# Patient Record
Sex: FEMALE | Race: WHITE | NOT HISPANIC OR LATINO | Employment: UNEMPLOYED | URBAN - METROPOLITAN AREA
[De-identification: names, ages, dates, MRNs, and addresses within clinical notes are randomized per-mention and may not be internally consistent; named-entity substitution may affect disease eponyms.]

---

## 2024-01-05 ENCOUNTER — APPOINTMENT (OUTPATIENT)
Dept: CARDIOLOGY | Facility: HOSPITAL | Age: 59
End: 2024-01-05
Payer: COMMERCIAL

## 2024-01-05 ENCOUNTER — APPOINTMENT (OUTPATIENT)
Dept: RADIOLOGY | Facility: HOSPITAL | Age: 59
End: 2024-01-05
Payer: COMMERCIAL

## 2024-01-05 ENCOUNTER — HOSPITAL ENCOUNTER (EMERGENCY)
Facility: HOSPITAL | Age: 59
Discharge: HOME | End: 2024-01-05
Payer: COMMERCIAL

## 2024-01-05 VITALS
TEMPERATURE: 98.6 F | DIASTOLIC BLOOD PRESSURE: 86 MMHG | HEIGHT: 66 IN | WEIGHT: 242.51 LBS | OXYGEN SATURATION: 98 % | RESPIRATION RATE: 20 BRPM | SYSTOLIC BLOOD PRESSURE: 156 MMHG | HEART RATE: 80 BPM | BODY MASS INDEX: 38.97 KG/M2

## 2024-01-05 DIAGNOSIS — R06.00 DYSPNEA, UNSPECIFIED TYPE: Primary | ICD-10-CM

## 2024-01-05 DIAGNOSIS — R07.89 CHEST DISCOMFORT: ICD-10-CM

## 2024-01-05 LAB
ALBUMIN SERPL-MCNC: 4.2 G/DL (ref 3.5–5)
ALP BLD-CCNC: 92 U/L (ref 35–125)
ALT SERPL-CCNC: 25 U/L (ref 5–40)
ANION GAP SERPL CALC-SCNC: 8 MMOL/L
AST SERPL-CCNC: 17 U/L (ref 5–40)
BASOPHILS # BLD AUTO: 0.11 X10*3/UL (ref 0–0.1)
BASOPHILS NFR BLD AUTO: 1.1 %
BILIRUB SERPL-MCNC: 0.3 MG/DL (ref 0.1–1.2)
BUN SERPL-MCNC: 20 MG/DL (ref 8–25)
CALCIUM SERPL-MCNC: 9.3 MG/DL (ref 8.5–10.4)
CHLORIDE SERPL-SCNC: 103 MMOL/L (ref 97–107)
CO2 SERPL-SCNC: 30 MMOL/L (ref 24–31)
CREAT SERPL-MCNC: 0.9 MG/DL (ref 0.4–1.6)
EOSINOPHIL # BLD AUTO: 0.22 X10*3/UL (ref 0–0.7)
EOSINOPHIL NFR BLD AUTO: 2.1 %
ERYTHROCYTE [DISTWIDTH] IN BLOOD BY AUTOMATED COUNT: 13.1 % (ref 11.5–14.5)
FLUAV RNA RESP QL NAA+PROBE: NOT DETECTED
FLUBV RNA RESP QL NAA+PROBE: NOT DETECTED
GFR SERPL CREATININE-BSD FRML MDRD: 75 ML/MIN/1.73M*2
GLUCOSE SERPL-MCNC: 107 MG/DL (ref 65–99)
HCT VFR BLD AUTO: 38.1 % (ref 36–46)
HGB BLD-MCNC: 12.6 G/DL (ref 12–16)
IMM GRANULOCYTES # BLD AUTO: 0.03 X10*3/UL (ref 0–0.7)
IMM GRANULOCYTES NFR BLD AUTO: 0.3 % (ref 0–0.9)
LYMPHOCYTES # BLD AUTO: 1.92 X10*3/UL (ref 1.2–4.8)
LYMPHOCYTES NFR BLD AUTO: 18.8 %
MCH RBC QN AUTO: 31.5 PG (ref 26–34)
MCHC RBC AUTO-ENTMCNC: 33.1 G/DL (ref 32–36)
MCV RBC AUTO: 95 FL (ref 80–100)
MONOCYTES # BLD AUTO: 0.77 X10*3/UL (ref 0.1–1)
MONOCYTES NFR BLD AUTO: 7.5 %
NEUTROPHILS # BLD AUTO: 7.19 X10*3/UL (ref 1.2–7.7)
NEUTROPHILS NFR BLD AUTO: 70.2 %
NRBC BLD-RTO: 0 /100 WBCS (ref 0–0)
NT-PROBNP SERPL-MCNC: 51 PG/ML (ref 0–226)
PLATELET # BLD AUTO: 310 X10*3/UL (ref 150–450)
POTASSIUM SERPL-SCNC: 3.5 MMOL/L (ref 3.4–5.1)
PROT SERPL-MCNC: 7 G/DL (ref 5.9–7.9)
RBC # BLD AUTO: 4 X10*6/UL (ref 4–5.2)
RSV RNA RESP QL NAA+PROBE: NOT DETECTED
SARS-COV-2 RNA RESP QL NAA+PROBE: NOT DETECTED
SODIUM SERPL-SCNC: 141 MMOL/L (ref 133–145)
TROPONIN T SERPL-MCNC: 7 NG/L
TROPONIN T SERPL-MCNC: <6 NG/L
WBC # BLD AUTO: 10.2 X10*3/UL (ref 4.4–11.3)

## 2024-01-05 PROCEDURE — 2550000001 HC RX 255 CONTRASTS: Performed by: PHYSICIAN ASSISTANT

## 2024-01-05 PROCEDURE — 80053 COMPREHEN METABOLIC PANEL: CPT | Performed by: PHYSICIAN ASSISTANT

## 2024-01-05 PROCEDURE — 85025 COMPLETE CBC W/AUTO DIFF WBC: CPT | Performed by: PHYSICIAN ASSISTANT

## 2024-01-05 PROCEDURE — 84484 ASSAY OF TROPONIN QUANT: CPT | Performed by: PHYSICIAN ASSISTANT

## 2024-01-05 PROCEDURE — 2500000002 HC RX 250 W HCPCS SELF ADMINISTERED DRUGS (ALT 637 FOR MEDICARE OP, ALT 636 FOR OP/ED): Performed by: PHYSICIAN ASSISTANT

## 2024-01-05 PROCEDURE — 36415 COLL VENOUS BLD VENIPUNCTURE: CPT | Performed by: PHYSICIAN ASSISTANT

## 2024-01-05 PROCEDURE — 71275 CT ANGIOGRAPHY CHEST: CPT

## 2024-01-05 PROCEDURE — 99284 EMERGENCY DEPT VISIT MOD MDM: CPT

## 2024-01-05 PROCEDURE — 83880 ASSAY OF NATRIURETIC PEPTIDE: CPT | Performed by: PHYSICIAN ASSISTANT

## 2024-01-05 PROCEDURE — 99285 EMERGENCY DEPT VISIT HI MDM: CPT

## 2024-01-05 PROCEDURE — 87637 SARSCOV2&INF A&B&RSV AMP PRB: CPT | Performed by: PHYSICIAN ASSISTANT

## 2024-01-05 PROCEDURE — 93005 ELECTROCARDIOGRAM TRACING: CPT

## 2024-01-05 PROCEDURE — 94640 AIRWAY INHALATION TREATMENT: CPT | Mod: 25

## 2024-01-05 RX ORDER — ALBUTEROL SULFATE 90 UG/1
2 AEROSOL, METERED RESPIRATORY (INHALATION) EVERY 4 HOURS PRN
Qty: 18 G | Refills: 0 | Status: SHIPPED | OUTPATIENT
Start: 2024-01-05 | End: 2024-02-04

## 2024-01-05 RX ORDER — ACETAMINOPHEN 325 MG/1
975 TABLET ORAL ONCE
Status: COMPLETED | OUTPATIENT
Start: 2024-01-05 | End: 2024-01-05

## 2024-01-05 RX ORDER — CETIRIZINE HYDROCHLORIDE 10 MG/1
10 TABLET ORAL DAILY
Qty: 30 TABLET | Refills: 0 | Status: SHIPPED | OUTPATIENT
Start: 2024-01-05 | End: 2024-02-04

## 2024-01-05 RX ORDER — IPRATROPIUM BROMIDE AND ALBUTEROL SULFATE 2.5; .5 MG/3ML; MG/3ML
3 SOLUTION RESPIRATORY (INHALATION)
Status: DISCONTINUED | OUTPATIENT
Start: 2024-01-05 | End: 2024-01-05 | Stop reason: HOSPADM

## 2024-01-05 RX ADMIN — IOHEXOL 75 ML: 350 INJECTION, SOLUTION INTRAVENOUS at 17:39

## 2024-01-05 RX ADMIN — ACETAMINOPHEN 975 MG: 325 TABLET ORAL at 18:28

## 2024-01-05 RX ADMIN — IPRATROPIUM BROMIDE AND ALBUTEROL SULFATE 3 ML: 2.5; .5 SOLUTION RESPIRATORY (INHALATION) at 18:28

## 2024-01-05 ASSESSMENT — PAIN - FUNCTIONAL ASSESSMENT: PAIN_FUNCTIONAL_ASSESSMENT: 0-10

## 2024-01-05 ASSESSMENT — COLUMBIA-SUICIDE SEVERITY RATING SCALE - C-SSRS
6. HAVE YOU EVER DONE ANYTHING, STARTED TO DO ANYTHING, OR PREPARED TO DO ANYTHING TO END YOUR LIFE?: NO
2. HAVE YOU ACTUALLY HAD ANY THOUGHTS OF KILLING YOURSELF?: NO
1. IN THE PAST MONTH, HAVE YOU WISHED YOU WERE DEAD OR WISHED YOU COULD GO TO SLEEP AND NOT WAKE UP?: NO

## 2024-01-05 ASSESSMENT — PAIN DESCRIPTION - PROGRESSION: CLINICAL_PROGRESSION: NOT CHANGED

## 2024-01-05 ASSESSMENT — PAIN SCALES - GENERAL: PAINLEVEL_OUTOF10: 0 - NO PAIN

## 2024-01-05 NOTE — ED PROVIDER NOTES
HPI   Chief Complaint   Patient presents with    Leg Swelling     Leg swelling and shortness of breath       58-year-old female presented emergency department with a chief complaint of shortness of breath, cough, lower extremity edema.  Symptoms been for the last week.  She does have a history of chronic lower extremity edema.  She is on torsemide.  She states that she does not have a history of congestive heart failure she was just on the torsemide for intermittent lower extremity edema.  She also complains of a wheeze.  She has a history of asthmatic bronchitis.  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  She is denying chest pain or pressure.  Nontoxic-appearing.  Normal vital signs.                          [place] Coma Scale Score: 15                  Patient History   Past Medical History:   Diagnosis Date    Personal history of diseases of the blood and blood-forming organs and certain disorders involving the immune mechanism     History of autoimmune disorder    Personal history of other diseases of the nervous system and sense organs     History of obstructive sleep apnea     Past Surgical History:   Procedure Laterality Date    OTHER SURGICAL HISTORY  11/02/2022    Sinus surgery    OTHER SURGICAL HISTORY  11/02/2022    Hysterectomy     No family history on file.  Social History     Tobacco Use    Smoking status: Unknown    Smokeless tobacco: Not on file   Substance Use Topics    Alcohol use: Not on file    Drug use: Not on file       Physical Exam   ED Triage Vitals [01/05/24 1543]   Temp Heart Rate Resp BP   37 °C (98.6 °F) 79 20 (!) 153/92      SpO2 Temp Source Heart Rate Source Patient Position   98 % Oral Monitor Sitting      BP Location FiO2 (%)     Right arm --       Physical Exam  Vitals and nursing note reviewed.   Constitutional:       Appearance: Normal appearance.   HENT:       Head: Normocephalic.      Nose: Nose normal.      Mouth/Throat:      Mouth: Mucous membranes are moist.   Cardiovascular:      Rate and Rhythm: Normal rate and regular rhythm.   Pulmonary:      Effort: Pulmonary effort is normal.      Breath sounds: Normal breath sounds.   Abdominal:      General: Abdomen is flat.      Palpations: Abdomen is soft.   Musculoskeletal:         General: Normal range of motion.      Cervical back: Normal range of motion and neck supple.   Skin:     General: Skin is warm and dry.   Neurological:      General: No focal deficit present.      Mental Status: She is alert and oriented to person, place, and time.   Psychiatric:         Mood and Affect: Mood normal.         ED Course & MDM   ED Course as of 01/05/24 1901 Fri Jan 05, 2024 1842 EKG reviewed by physician with independent interpretation by myself reveals a normal sinus rhythm with a ventricular rate of 76 bpm, PA interval 168, QRS 98, QTc 432, normal axis, no ST segment elevation, no EKG immediately comparable [JH]      ED Course User Index  [JH] Brendan Chow PA-C         Diagnoses as of 01/05/24 1901   Dyspnea, unspecified type   Chest discomfort       Medical Decision Making  I have seen and evaluated this patient.  Physician available for consultation.  Vital signs have been reviewed.  All laboratory and diagnostic imaging is reviewed by myself and interpreted by myself unless otherwise stated.  Additionally imaging is interpreted by radiologist.    CBC without significant leukocytosis or anemia, metabolic panel without significant renal impairment or electrolyte abnormality.  Troponin within an appropriate range without significant delta change, chest x-ray without actionable cardiopulmonary process, EKG without evidence of acute ischemia.  CT angiogram negative for pulmonary embolism or pulmonary abnormality.  Patient has improvement of DuoNeb aerosol treatment.  There is no evidence of acute coronary syndrome,  pulmonary embolism, pneumonia.  Suspect this is likely an asthmatic bronchitis from patient's recent exposure to the animal dander.  Has improvement after DuoNeb treatment.  Will continue albuterol and cetirizine.  Released in good condition.    Labs Reviewed  CBC WITH AUTO DIFFERENTIAL - Abnormal     WBC                           10.2                   nRBC                          0.0                    RBC                           4.00                   Hemoglobin                    12.6                   Hematocrit                    38.1                   MCV                           95                     MCH                           31.5                   MCHC                          33.1                   RDW                           13.1                   Platelets                     310                    Neutrophils %                 70.2                   Immature Granulocytes %, Automated   0.3                    Lymphocytes %                 18.8                   Monocytes %                   7.5                    Eosinophils %                 2.1                    Basophils %                   1.1                    Neutrophils Absolute          7.19                   Immature Granulocytes Absolute, Au*   0.03                   Lymphocytes Absolute          1.92                   Monocytes Absolute            0.77                   Eosinophils Absolute          0.22                   Basophils Absolute            0.11 (*)            COMPREHENSIVE METABOLIC PANEL - Abnormal     Glucose                       107 (*)                Sodium                        141                    Potassium                     3.5                    Chloride                      103                    Bicarbonate                   30                     Urea Nitrogen                 20                     Creatinine                    0.90                   eGFR                          75                      Calcium                       9.3                    Albumin                       4.2                    Alkaline Phosphatase          92                     Total Protein                 7.0                    AST                           17                     Bilirubin, Total              0.3                    ALT                           25                     Anion Gap                     8                   N-TERMINAL PROBNP - Normal     PROBNP                        51                         Narrative: Reference ranges are based on clinical submission data. These ranges represent the 95th percentile of normal cut-off points. As NT Pro- BNP values approach 1000 pg/ml, clinical symptoms are more likely associated with CHF.  RSV PCR - Normal     RSV PCR                                                  Narrative: This assay is an FDA-cleared, in vitro diagnostic nucleic acid amplification test for the detection of RSV from nasopharyngeal specimens, and has been validated for use at [facility]. Negative results do not preclude RSV infections, and should not be used as the sole basis for diagnosis, treatment, or other management decisions. If Influenza A/B and RSV PCR results are negative, testing for Parainfluenza virus, Adenovirus and Metapneumovirus is routinely performed for pediatric oncology and intensive care inpatients at [facility] and is available on other patients by placing an add-on request.                                      SARS-COV-2 AND INFLUENZA A/B PCR - Normal     Flu A Result                                         Flu B Result                                         Coronavirus 2019, PCR                                    Narrative: This assay has received FDA Emergency Use Authorization (EUA) and  is only authorized for the duration of time that circumstances exist to justify the authorization of the emergency use of in vitro diagnostic tests for the detection of  SARS-CoV-2 virus and/or diagnosis of COVID-19 infection under section 564(b)(1) of the Act, 21 U.S.C. 360bbb-3(b)(1). Testing for SARS-CoV-2 is only recommended for patients who meet current clinical and/or epidemiological criteria as defined by federal, state, or local public health directives. This assay is an in vitro diagnostic nucleic acid amplification test for the qualitative detection of SARS-CoV-2, Influenza A, and Influenza B from nasopharyngeal specimens and has been validated for use at [facility]. Negative results do not preclude COVID-19 infections or Influenza A/B infections, and should not be used as the sole basis for diagnosis, treatment, or other management decisions. If Influenza A/B and RSV PCR results are negative, testing for Parainfluenza virus, Adenovirus and Metapneumovirus is routinely performed for [facility] pediatric oncology and intensive care inpatients, and is available on other patients by placing an add-on request.   SERIAL TROPONIN, INITIAL ([facility]) - Normal     Troponin T, High Sensitivity   7                   SERIAL TROPONIN,  2 HOUR ([facility]) - Normal     Troponin T, High Sensitivity   <6                  TROPONIN T SERIES, HIGH SENSITIVITY (0, 2 HR, 6 HR)    CT angio chest for pulmonary embolism   Final Result    No acute pathologic findings are identified.          MACRO:    None.                Signed by: Gal Ritchie 1/5/2024 6:05 PM    Dictation workstation:   [id number]              Your medication list        START taking these medications        Instructions Last Dose Given Next Dose Due   albuterol 90 mcg/actuation inhaler      Inhale 2 puffs every 4 hours if needed for wheezing.       cetirizine 10 mg tablet  Commonly known as: ZyrTEC      Take 1 tablet (10 mg) by mouth once daily for 30 doses.                 Where to Get Your Medications        You can get these medications from any pharmacy    Bring a paper prescription for each of these  medications  albuterol 90 mcg/actuation inhaler  cetirizine 10 mg tablet           Procedure  Procedures     Brendan Chow PA-C  01/05/24 3721

## 2024-02-07 ENCOUNTER — HOSPITAL ENCOUNTER (EMERGENCY)
Facility: HOSPITAL | Age: 59
Discharge: LEFT BEFORE DISCHARGE | End: 2024-02-07
Payer: COMMERCIAL

## 2024-02-07 ENCOUNTER — APPOINTMENT (OUTPATIENT)
Dept: RADIOLOGY | Facility: HOSPITAL | Age: 59
End: 2024-02-07
Payer: COMMERCIAL

## 2024-02-07 VITALS
BODY MASS INDEX: 40.11 KG/M2 | RESPIRATION RATE: 20 BRPM | OXYGEN SATURATION: 95 % | HEIGHT: 66 IN | DIASTOLIC BLOOD PRESSURE: 85 MMHG | HEART RATE: 95 BPM | WEIGHT: 249.56 LBS | SYSTOLIC BLOOD PRESSURE: 126 MMHG | TEMPERATURE: 99.7 F

## 2024-02-07 LAB
ALBUMIN SERPL-MCNC: 4 G/DL (ref 3.5–5)
ALP BLD-CCNC: 91 U/L (ref 35–125)
ALT SERPL-CCNC: 17 U/L (ref 5–40)
ANION GAP SERPL CALC-SCNC: 9 MMOL/L
APPEARANCE UR: CLEAR
AST SERPL-CCNC: 15 U/L (ref 5–40)
BASOPHILS # BLD AUTO: 0.03 X10*3/UL (ref 0–0.1)
BASOPHILS NFR BLD AUTO: 0.3 %
BILIRUB SERPL-MCNC: 0.6 MG/DL (ref 0.1–1.2)
BILIRUB UR STRIP.AUTO-MCNC: NEGATIVE MG/DL
BUN SERPL-MCNC: 19 MG/DL (ref 8–25)
CALCIUM SERPL-MCNC: 8.8 MG/DL (ref 8.5–10.4)
CHLORIDE SERPL-SCNC: 104 MMOL/L (ref 97–107)
CO2 SERPL-SCNC: 27 MMOL/L (ref 24–31)
COLOR UR: NORMAL
CREAT SERPL-MCNC: 0.8 MG/DL (ref 0.4–1.6)
EGFRCR SERPLBLD CKD-EPI 2021: 87 ML/MIN/1.73M*2
EOSINOPHIL # BLD AUTO: 0.04 X10*3/UL (ref 0–0.7)
EOSINOPHIL NFR BLD AUTO: 0.3 %
ERYTHROCYTE [DISTWIDTH] IN BLOOD BY AUTOMATED COUNT: 12.9 % (ref 11.5–14.5)
GLUCOSE SERPL-MCNC: 128 MG/DL (ref 65–99)
GLUCOSE UR STRIP.AUTO-MCNC: NORMAL MG/DL
HCG UR QL IA.RAPID: NEGATIVE
HCT VFR BLD AUTO: 41.3 % (ref 36–46)
HGB BLD-MCNC: 13.8 G/DL (ref 12–16)
IMM GRANULOCYTES # BLD AUTO: 0.04 X10*3/UL (ref 0–0.7)
IMM GRANULOCYTES NFR BLD AUTO: 0.3 % (ref 0–0.9)
KETONES UR STRIP.AUTO-MCNC: NEGATIVE MG/DL
LEUKOCYTE ESTERASE UR QL STRIP.AUTO: NEGATIVE
LIPASE SERPL-CCNC: 21 U/L (ref 16–63)
LYMPHOCYTES # BLD AUTO: 0.5 X10*3/UL (ref 1.2–4.8)
LYMPHOCYTES NFR BLD AUTO: 4.2 %
MCH RBC QN AUTO: 31.7 PG (ref 26–34)
MCHC RBC AUTO-ENTMCNC: 33.4 G/DL (ref 32–36)
MCV RBC AUTO: 95 FL (ref 80–100)
MONOCYTES # BLD AUTO: 0.42 X10*3/UL (ref 0.1–1)
MONOCYTES NFR BLD AUTO: 3.5 %
NEUTROPHILS # BLD AUTO: 10.84 X10*3/UL (ref 1.2–7.7)
NEUTROPHILS NFR BLD AUTO: 91.4 %
NITRITE UR QL STRIP.AUTO: NEGATIVE
NRBC BLD-RTO: 0 /100 WBCS (ref 0–0)
PH UR STRIP.AUTO: 5.5 [PH]
PLATELET # BLD AUTO: 303 X10*3/UL (ref 150–450)
POTASSIUM SERPL-SCNC: 3.7 MMOL/L (ref 3.4–5.1)
PROT SERPL-MCNC: 6.7 G/DL (ref 5.9–7.9)
PROT UR STRIP.AUTO-MCNC: NORMAL MG/DL
RBC # BLD AUTO: 4.35 X10*6/UL (ref 4–5.2)
RBC # UR STRIP.AUTO: NEGATIVE /UL
RBC #/AREA URNS AUTO: NORMAL /HPF
SODIUM SERPL-SCNC: 140 MMOL/L (ref 133–145)
SP GR UR STRIP.AUTO: 1.03
SQUAMOUS #/AREA URNS AUTO: NORMAL /HPF
UROBILINOGEN UR STRIP.AUTO-MCNC: NORMAL MG/DL
WBC # BLD AUTO: 11.9 X10*3/UL (ref 4.4–11.3)
WBC #/AREA URNS AUTO: NORMAL /HPF

## 2024-02-07 PROCEDURE — 74177 CT ABD & PELVIS W/CONTRAST: CPT

## 2024-02-07 PROCEDURE — 81025 URINE PREGNANCY TEST: CPT | Performed by: EMERGENCY MEDICINE

## 2024-02-07 PROCEDURE — 2500000004 HC RX 250 GENERAL PHARMACY W/ HCPCS (ALT 636 FOR OP/ED): Performed by: EMERGENCY MEDICINE

## 2024-02-07 PROCEDURE — 99284 EMERGENCY DEPT VISIT MOD MDM: CPT

## 2024-02-07 PROCEDURE — 83690 ASSAY OF LIPASE: CPT | Performed by: EMERGENCY MEDICINE

## 2024-02-07 PROCEDURE — 81001 URINALYSIS AUTO W/SCOPE: CPT | Performed by: EMERGENCY MEDICINE

## 2024-02-07 PROCEDURE — 96374 THER/PROPH/DIAG INJ IV PUSH: CPT

## 2024-02-07 PROCEDURE — 80053 COMPREHEN METABOLIC PANEL: CPT | Performed by: EMERGENCY MEDICINE

## 2024-02-07 PROCEDURE — 36415 COLL VENOUS BLD VENIPUNCTURE: CPT | Performed by: EMERGENCY MEDICINE

## 2024-02-07 PROCEDURE — 85025 COMPLETE CBC W/AUTO DIFF WBC: CPT | Performed by: EMERGENCY MEDICINE

## 2024-02-07 PROCEDURE — 2550000001 HC RX 255 CONTRASTS: Performed by: EMERGENCY MEDICINE

## 2024-02-07 PROCEDURE — 96361 HYDRATE IV INFUSION ADD-ON: CPT

## 2024-02-07 RX ORDER — ONDANSETRON HYDROCHLORIDE 2 MG/ML
4 INJECTION, SOLUTION INTRAVENOUS ONCE
Status: COMPLETED | OUTPATIENT
Start: 2024-02-07 | End: 2024-02-07

## 2024-02-07 RX ADMIN — ONDANSETRON 4 MG: 2 INJECTION INTRAMUSCULAR; INTRAVENOUS at 18:54

## 2024-02-07 RX ADMIN — IOHEXOL 75 ML: 350 INJECTION, SOLUTION INTRAVENOUS at 19:28

## 2024-02-07 RX ADMIN — SODIUM CHLORIDE 1000 ML: 900 INJECTION, SOLUTION INTRAVENOUS at 18:55

## 2024-02-07 ASSESSMENT — PAIN SCALES - GENERAL: PAINLEVEL_OUTOF10: 10 - WORST POSSIBLE PAIN

## 2024-02-07 ASSESSMENT — PAIN DESCRIPTION - DESCRIPTORS: DESCRIPTORS: ACHING

## 2024-02-07 ASSESSMENT — COLUMBIA-SUICIDE SEVERITY RATING SCALE - C-SSRS
6. HAVE YOU EVER DONE ANYTHING, STARTED TO DO ANYTHING, OR PREPARED TO DO ANYTHING TO END YOUR LIFE?: NO
1. IN THE PAST MONTH, HAVE YOU WISHED YOU WERE DEAD OR WISHED YOU COULD GO TO SLEEP AND NOT WAKE UP?: NO
2. HAVE YOU ACTUALLY HAD ANY THOUGHTS OF KILLING YOURSELF?: NO

## 2024-02-07 ASSESSMENT — PAIN - FUNCTIONAL ASSESSMENT: PAIN_FUNCTIONAL_ASSESSMENT: 0-10

## 2024-02-07 ASSESSMENT — PAIN DESCRIPTION - ONSET: ONSET: GRADUAL

## 2024-02-07 ASSESSMENT — PAIN DESCRIPTION - PROGRESSION: CLINICAL_PROGRESSION: GRADUALLY WORSENING

## 2024-02-07 ASSESSMENT — PAIN DESCRIPTION - PAIN TYPE: TYPE: ACUTE PAIN

## 2024-02-07 ASSESSMENT — PAIN DESCRIPTION - LOCATION: LOCATION: ABDOMEN

## 2024-02-07 ASSESSMENT — PAIN DESCRIPTION - FREQUENCY: FREQUENCY: CONSTANT/CONTINUOUS

## 2024-02-07 NOTE — ED TRIAGE NOTES
TRIAGE NOTE   I saw the patient as the Clinician in Triage and performed a brief history and physical exam, established acuity, and ordered appropriate tests to develop basic plan of care. Patient will be seen by an [facility], resident and/or physician who will independently evaluate the patient. Please see subsequent provider notes for further details and disposition.     Brief HPI: In brief, Maye Lopez is a 58 y.o. female that presents for evaluation of abdominal pain.  The patient reports a 4-day history of diffuse upper abdominal pain and bloating.  She states that she had a small bowel movement 2 days ago on Monday but otherwise has been feeling constipated.  Denies any blood per rectum.  She has been nauseated and has had several episodes of vomiting today.  No chest pain or shortness of breath.    Focused Physical exam:   Triage vitals are unremarkable.  Abdomen is soft and nondistended and mildly tender with palpation diffusely across both upper quadrants.  Lower quadrants are nontender.  There is no guarding or rigidity.    Plan/MDM:   Plan is for IV fluids, IV Zofran, labs and CT.  Please see subsequent provider note for further details and disposition     Nolan Harrell D.O.  6:50 PM

## 2024-02-19 LAB
ATRIAL RATE: 76 BPM
P AXIS: 37 DEGREES
P OFFSET: 196 MS
P ONSET: 133 MS
PR INTERVAL: 168 MS
Q ONSET: 217 MS
QRS COUNT: 13 BEATS
QRS DURATION: 98 MS
QT INTERVAL: 384 MS
QTC CALCULATION(BAZETT): 432 MS
QTC FREDERICIA: 415 MS
R AXIS: 44 DEGREES
T AXIS: 35 DEGREES
T OFFSET: 409 MS
VENTRICULAR RATE: 76 BPM

## 2024-03-14 ENCOUNTER — PHARMACY VISIT (OUTPATIENT)
Dept: PHARMACY | Facility: CLINIC | Age: 59
End: 2024-03-14
Payer: COMMERCIAL

## 2024-03-14 PROCEDURE — RXMED WILLOW AMBULATORY MEDICATION CHARGE

## 2024-03-14 RX ORDER — SEMAGLUTIDE 0.25 MG/.5ML
0.25 INJECTION, SOLUTION SUBCUTANEOUS
Qty: 2 ML | Refills: 0 | OUTPATIENT
Start: 2024-01-23

## 2024-07-08 ENCOUNTER — APPOINTMENT (OUTPATIENT)
Dept: OPHTHALMOLOGY | Facility: CLINIC | Age: 59
End: 2024-07-08
Payer: COMMERCIAL

## 2024-07-08 DIAGNOSIS — H10.13 ALLERGIC CONJUNCTIVITIS OF BOTH EYES: ICD-10-CM

## 2024-07-08 DIAGNOSIS — H25.13 AGE-RELATED NUCLEAR CATARACT OF BOTH EYES: ICD-10-CM

## 2024-07-08 DIAGNOSIS — H04.123 DRY EYE SYNDROME OF LACRIMAL GLAND, BILATERAL: Primary | ICD-10-CM

## 2024-07-08 PROCEDURE — LSREX EMPLOYEE LASER EYE EXAM: Performed by: STUDENT IN AN ORGANIZED HEALTH CARE EDUCATION/TRAINING PROGRAM

## 2024-07-08 ASSESSMENT — EXTERNAL EXAM - RIGHT EYE: OD_EXAM: NORMAL

## 2024-07-08 ASSESSMENT — VISUAL ACUITY
OS_PH_SC+: -1
OD_SC+: -2
OS_SC+: -2
OS_SC: 20/50
OD_SC: 20/50
METHOD: SNELLEN - LINEAR
OD_PH_SC: 20/30
OS_PH_SC: 20/40

## 2024-07-08 ASSESSMENT — TONOMETRY
OD_IOP_MMHG: 16
OS_IOP_MMHG: 14
IOP_METHOD: GOLDMANN APPLANATION

## 2024-07-08 ASSESSMENT — ENCOUNTER SYMPTOMS
ENDOCRINE NEGATIVE: 0
HEMATOLOGIC/LYMPHATIC NEGATIVE: 0
GASTROINTESTINAL NEGATIVE: 0
ALLERGIC/IMMUNOLOGIC NEGATIVE: 0
NEUROLOGICAL NEGATIVE: 0
MUSCULOSKELETAL NEGATIVE: 0
RESPIRATORY NEGATIVE: 0
CARDIOVASCULAR NEGATIVE: 0
EYES NEGATIVE: 0
PSYCHIATRIC NEGATIVE: 0
CONSTITUTIONAL NEGATIVE: 0

## 2024-07-08 ASSESSMENT — CONF VISUAL FIELD
OS_INFERIOR_NASAL_RESTRICTION: 0
OD_SUPERIOR_TEMPORAL_RESTRICTION: 0
OS_SUPERIOR_NASAL_RESTRICTION: 0
OD_NORMAL: 1
OD_INFERIOR_TEMPORAL_RESTRICTION: 0
OS_NORMAL: 1
OD_INFERIOR_NASAL_RESTRICTION: 0
METHOD: COUNTING FINGERS
OD_SUPERIOR_NASAL_RESTRICTION: 0
OS_INFERIOR_TEMPORAL_RESTRICTION: 0
OS_SUPERIOR_TEMPORAL_RESTRICTION: 0

## 2024-07-08 ASSESSMENT — SLIT LAMP EXAM - LIDS
COMMENTS: NORMAL, MILD MGD
COMMENTS: NORMAL, MILD MGD

## 2024-07-08 ASSESSMENT — EXTERNAL EXAM - LEFT EYE: OS_EXAM: NORMAL

## 2024-07-08 ASSESSMENT — CUP TO DISC RATIO
OS_RATIO: .30
OD_RATIO: .30

## 2024-07-08 NOTE — PROGRESS NOTES
Assessment/Plan   Diagnoses and all orders for this visit:  Dry eye syndrome of lacrimal gland, bilateral  Allergic conjunctivitis of both eyes  Age-related nuclear cataract of both eyes  -patient here for employee laser eye exam with [employer] pinhole (ph) OD 20/30 OS 20/40  -currently using NVO specs  -reports using Refresh gtt for dry eye with worsening symptoms  -discussed RTC for eval-would consider plugs first and then adding Xiidra  -cataract OS>OD becoming visually significant-pt has been seeing an outside ophthalmologist for evaluation and surgical consideration    RTC for annual with STEPHEN FERREIRA

## 2024-09-20 ENCOUNTER — OFFICE VISIT (OUTPATIENT)
Dept: OTOLARYNGOLOGY | Facility: CLINIC | Age: 59
End: 2024-09-20
Payer: COMMERCIAL

## 2024-09-20 VITALS — WEIGHT: 235 LBS | HEIGHT: 66 IN | BODY MASS INDEX: 37.77 KG/M2

## 2024-09-20 DIAGNOSIS — J32.4 CHRONIC PANSINUSITIS: ICD-10-CM

## 2024-09-20 DIAGNOSIS — H10.13 ALLERGIC CONJUNCTIVITIS OF BOTH EYES: ICD-10-CM

## 2024-09-20 DIAGNOSIS — J30.9 ALLERGIC RHINITIS, UNSPECIFIED SEASONALITY, UNSPECIFIED TRIGGER: Primary | ICD-10-CM

## 2024-09-20 DIAGNOSIS — J31.0 CHRONIC RHINITIS: ICD-10-CM

## 2024-09-20 PROCEDURE — 3008F BODY MASS INDEX DOCD: CPT | Performed by: OTOLARYNGOLOGY

## 2024-09-20 PROCEDURE — 31231 NASAL ENDOSCOPY DX: CPT | Performed by: OTOLARYNGOLOGY

## 2024-09-20 PROCEDURE — 99213 OFFICE O/P EST LOW 20 MIN: CPT | Performed by: OTOLARYNGOLOGY

## 2024-09-20 NOTE — PROGRESS NOTES
"Chief Complaint   Patient presents with    Follow-up     Ep 11/ 22- allergies       Date of Evaluation: 9/20/2024   HPI  She has been troubled by allergic rhinitis and recurrent sinusitis over the past several months.  She is getting periorbital swelling.  Ophthalmology started her on antihistamine drops.  She has been treated with antibiotics and is using Flonase.  History of bilateral sinus surgery and septoplasty by Dr. Sifuentes many years ago.  Maye Lopez is a 59 y.o. female with allergic rhinitis nasal obstruction and hoarseness. She has had general medical issues with a myositis that required IV therapy. This has now settled down. She has gained weight through the years and sleep apnea has become a bigger issue. She had a sleep study that showed mild to moderate sleep apnea and she is on CPAP. She is having difficulty tolerating it. She is working on weight loss.        Past Medical History:   Diagnosis Date    Personal history of diseases of the blood and blood-forming organs and certain disorders involving the immune mechanism     History of autoimmune disorder    Personal history of other diseases of the nervous system and sense organs     History of obstructive sleep apnea      Past Surgical History:   Procedure Laterality Date    OTHER SURGICAL HISTORY  11/02/2022    Sinus surgery    OTHER SURGICAL HISTORY  11/02/2022    Hysterectomy          Medications:   Current Outpatient Medications   Medication Instructions    albuterol 90 mcg/actuation inhaler 2 puffs, inhalation, Every 4 hours PRN    cetirizine (ZYRTEC) 10 mg, oral, Daily    fluticasone propionate 93 mcg/actuation aerosol breath activated 2 puffs each nostril twice daily    Wegovy 0.25 mg, subcutaneous, Once Weekly        Allergies:  Allergies   Allergen Reactions    Avelox [Moxifloxacin] Anaphylaxis    Ciprofloxacin Hives    Phenergan [Promethazine] Hives    Sulfa Dyne Hives        Physical Exam:  Last Recorded Vitals  Height 1.676 m (5' 6\"), " weight 107 kg (235 lb).  []General appearance: Well-developed, well-nourished in no acute distress, conversant with normal voice quality    Head/face: No erythema or edema or facial tenderness, and normal facial nerve function bilaterally    External ear: Clear external auditory canals with normal pinnae  Tube status: N/A  Middle ear: Tympanic membranes intact and mobile, middle ears normal.  Tympanic membrane perforation: N/A  Mastoid bowl: N/A  Hearing: Normal conversational awareness at normal speech thresholds    Nose visualized using: Nasal endoscopy  Nasal dorsum: Nontraumatic midline appearance  Septum: Midline, nonobstructing.  Sinuses are widely patent with surgical changes.  No evidence of infection  Inferior turbinates: Normal, pink  Secretions: Dry    Oral cavity and oropharynx: Normal  Teeth: Good condition  Floor of mouth: without lesions  Palate: Normal hard palate, soft palate and uvula  Oropharynx: Clear, no lesions present  Buccal mucosa: Normal without masses or lesions  Lips: Normal    Nasopharynx: Inadequate mirror exam secondary to gag/anatomy    Larynx and hypopharynx: Mirror examination adequate and revealed epiglottis normal, vocal cord mobility normal, normal mucosa, false vocal cords normal, true vocal cords normal    Neck:  Salivary glands: Normal bilateral parotid and submandibular glands by inspection and palpation.  Non-thyroid masses: No palpable masses or significant lymphadenopathy  Trachea: Midline  Thyroid: No thyromegaly or palpable nodules  Temporomandibular joint: Nontender  Cervical range of motion: Normal    Neurologic exam: Alert and oriented x3, appropriate affect.  Cranial nerves II-XII normal bilaterally  Extraocular movement: Extraocular movement intact, normal gaze alignment          Maye was seen today for follow-up.  Diagnoses and all orders for this visit:  Allergic rhinitis, unspecified seasonality, unspecified trigger (Primary)  -     fluticasone propionate 93  mcg/actuation aerosol breath activated; 2 puffs each nostril twice daily  Chronic rhinitis  Chronic pansinusitis  Allergic conjunctivitis of both eyes       PLAN  Her nasal endoscopic exam does not show any persistent infection and I do not believe antibiotics will be of any further value.  Her allergy situation is really bothering her.  I would like to switch her from Flonase to Xhance.  She is already on Xyzal.  Allergy eyedrops.    Dino Wilkinson MD

## 2024-10-08 ENCOUNTER — TELEPHONE (OUTPATIENT)
Dept: OTOLARYNGOLOGY | Facility: CLINIC | Age: 59
End: 2024-10-08
Payer: COMMERCIAL

## 2024-10-08 NOTE — TELEPHONE ENCOUNTER
Pt was sent in xhance and was too expensive (insurance did not cover) she was wondering if you recommend any alternatives, or if she is a candidate for nucala

## 2024-10-09 DIAGNOSIS — B99.9 INFECTION: Primary | ICD-10-CM

## 2024-10-09 RX ORDER — AMOXICILLIN AND CLAVULANATE POTASSIUM 875; 125 MG/1; MG/1
1 TABLET, FILM COATED ORAL 2 TIMES DAILY
Qty: 20 TABLET | Refills: 0 | Status: SHIPPED | OUTPATIENT
Start: 2024-10-09 | End: 2024-10-19

## 2024-10-30 ENCOUNTER — PATIENT MESSAGE (OUTPATIENT)
Dept: OTOLARYNGOLOGY | Facility: CLINIC | Age: 59
End: 2024-10-30
Payer: COMMERCIAL

## 2025-01-13 ENCOUNTER — TELEPHONE (OUTPATIENT)
Dept: OTOLARYNGOLOGY | Facility: CLINIC | Age: 60
End: 2025-01-13
Payer: COMMERCIAL

## 2025-01-13 DIAGNOSIS — B99.9 INFECTION: Primary | ICD-10-CM

## 2025-01-13 RX ORDER — AMOXICILLIN AND CLAVULANATE POTASSIUM 875; 125 MG/1; MG/1
1 TABLET, FILM COATED ORAL 2 TIMES DAILY
Qty: 20 TABLET | Refills: 0 | Status: SHIPPED | OUTPATIENT
Start: 2025-01-13 | End: 2025-01-23

## 2025-01-13 NOTE — TELEPHONE ENCOUNTER
LOV 9/2024 for frequent sinus infections, allergies. Last treated for sinus infection 10/2024 with augmentin. Called with new s/s sinus infection. C/O 5 days feeling miserable. Neg covid and flu. States congestion with green-yellow drainage, pressure and swelling around cheeks and nose, fever off/on. Using nasal spray and netti pot. She is requesting an abx. ALL: avelox, ciprofloxacin, sulfa, phenergan.

## 2025-02-26 ENCOUNTER — OFFICE VISIT (OUTPATIENT)
Dept: PRIMARY CARE | Facility: EXTERNAL LOCATION | Age: 60
End: 2025-02-26

## 2025-02-26 DIAGNOSIS — Z11.1 SCREENING-PULMONARY TB: Primary | ICD-10-CM

## 2025-02-26 NOTE — PROGRESS NOTES
PPD Placement note  Maye CHARLES Lopze, 59 y.o. female is here today for placement of PPD test  Reason for PPD test: [employer]  Pt taken PPD test before: yes  Verified in allergy area and with patient that they are not allergic to the products PPD is made of (Phenol or Tween). Yes  Is patient taking any oral or IV steroid medication now or have they taken it in the last month? no  Has the patient received a live vaccine in the past month?: no  Has the patient been in recent contact with anyone known or suspected of having active TB disease?: no    O: Alert and oriented in NAD.  P:  PPD placed on 2/26/2025.  Patient advised to return for reading within 48-72 hours.     Patient not seen by provider. Nurse visit only.

## 2025-07-29 ENCOUNTER — APPOINTMENT (OUTPATIENT)
Dept: RADIOLOGY | Facility: HOSPITAL | Age: 60
End: 2025-07-29
Payer: COMMERCIAL

## 2025-07-29 ENCOUNTER — HOSPITAL ENCOUNTER (EMERGENCY)
Facility: HOSPITAL | Age: 60
Discharge: HOME | End: 2025-07-29
Attending: EMERGENCY MEDICINE
Payer: COMMERCIAL

## 2025-07-29 VITALS
DIASTOLIC BLOOD PRESSURE: 86 MMHG | BODY MASS INDEX: 36.64 KG/M2 | WEIGHT: 228 LBS | TEMPERATURE: 98.1 F | HEIGHT: 66 IN | RESPIRATION RATE: 17 BRPM | HEART RATE: 79 BPM | SYSTOLIC BLOOD PRESSURE: 154 MMHG | OXYGEN SATURATION: 97 %

## 2025-07-29 DIAGNOSIS — M25.551 PAIN OF RIGHT HIP: ICD-10-CM

## 2025-07-29 DIAGNOSIS — R10.9 FLANK PAIN: Primary | ICD-10-CM

## 2025-07-29 LAB
ALBUMIN SERPL BCP-MCNC: 4.2 G/DL (ref 3.4–5)
ALP SERPL-CCNC: 56 U/L (ref 33–110)
ALT SERPL W P-5'-P-CCNC: 17 U/L (ref 7–45)
ANION GAP SERPL CALCULATED.3IONS-SCNC: 10 MMOL/L (ref 10–20)
APPEARANCE UR: CLEAR
AST SERPL W P-5'-P-CCNC: 17 U/L (ref 9–39)
BASOPHILS # BLD AUTO: 0.07 X10*3/UL (ref 0–0.1)
BASOPHILS NFR BLD AUTO: 0.9 %
BILIRUB SERPL-MCNC: 0.3 MG/DL (ref 0–1.2)
BILIRUB UR STRIP.AUTO-MCNC: NEGATIVE MG/DL
BUN SERPL-MCNC: 21 MG/DL (ref 6–23)
CALCIUM SERPL-MCNC: 9.4 MG/DL (ref 8.6–10.3)
CHLORIDE SERPL-SCNC: 104 MMOL/L (ref 98–107)
CO2 SERPL-SCNC: 30 MMOL/L (ref 21–32)
COLOR UR: ABNORMAL
CREAT SERPL-MCNC: 1.08 MG/DL (ref 0.5–1.05)
EGFRCR SERPLBLD CKD-EPI 2021: 60 ML/MIN/1.73M*2
EOSINOPHIL # BLD AUTO: 0.26 X10*3/UL (ref 0–0.7)
EOSINOPHIL NFR BLD AUTO: 3.3 %
ERYTHROCYTE [DISTWIDTH] IN BLOOD BY AUTOMATED COUNT: 12.3 % (ref 11.5–14.5)
GLUCOSE SERPL-MCNC: 104 MG/DL (ref 74–99)
GLUCOSE UR STRIP.AUTO-MCNC: NORMAL MG/DL
HCT VFR BLD AUTO: 38.6 % (ref 36–46)
HGB BLD-MCNC: 12.6 G/DL (ref 12–16)
IMM GRANULOCYTES # BLD AUTO: 0.03 X10*3/UL (ref 0–0.7)
IMM GRANULOCYTES NFR BLD AUTO: 0.4 % (ref 0–0.9)
KETONES UR STRIP.AUTO-MCNC: NEGATIVE MG/DL
LEUKOCYTE ESTERASE UR QL STRIP.AUTO: ABNORMAL
LYMPHOCYTES # BLD AUTO: 2.42 X10*3/UL (ref 1.2–4.8)
LYMPHOCYTES NFR BLD AUTO: 30.3 %
MCH RBC QN AUTO: 30.5 PG (ref 26–34)
MCHC RBC AUTO-ENTMCNC: 32.6 G/DL (ref 32–36)
MCV RBC AUTO: 94 FL (ref 80–100)
MONOCYTES # BLD AUTO: 0.6 X10*3/UL (ref 0.1–1)
MONOCYTES NFR BLD AUTO: 7.5 %
NEUTROPHILS # BLD AUTO: 4.62 X10*3/UL (ref 1.2–7.7)
NEUTROPHILS NFR BLD AUTO: 57.6 %
NITRITE UR QL STRIP.AUTO: NEGATIVE
NRBC BLD-RTO: 0 /100 WBCS (ref 0–0)
PH UR STRIP.AUTO: 5 [PH]
PLATELET # BLD AUTO: 335 X10*3/UL (ref 150–450)
POTASSIUM SERPL-SCNC: 3.5 MMOL/L (ref 3.5–5.3)
PROT SERPL-MCNC: 7.3 G/DL (ref 6.4–8.2)
PROT UR STRIP.AUTO-MCNC: NEGATIVE MG/DL
RBC # BLD AUTO: 4.13 X10*6/UL (ref 4–5.2)
RBC # UR STRIP.AUTO: NEGATIVE MG/DL
RBC #/AREA URNS AUTO: NORMAL /HPF
SODIUM SERPL-SCNC: 140 MMOL/L (ref 136–145)
SP GR UR STRIP.AUTO: 1.02
SQUAMOUS #/AREA URNS AUTO: NORMAL /HPF
UROBILINOGEN UR STRIP.AUTO-MCNC: NORMAL MG/DL
WBC # BLD AUTO: 8 X10*3/UL (ref 4.4–11.3)
WBC #/AREA URNS AUTO: NORMAL /HPF

## 2025-07-29 PROCEDURE — 2550000001 HC RX 255 CONTRASTS: Performed by: EMERGENCY MEDICINE

## 2025-07-29 PROCEDURE — 87086 URINE CULTURE/COLONY COUNT: CPT | Mod: TRILAB | Performed by: PHYSICIAN ASSISTANT

## 2025-07-29 PROCEDURE — 81001 URINALYSIS AUTO W/SCOPE: CPT | Performed by: PHYSICIAN ASSISTANT

## 2025-07-29 PROCEDURE — 85025 COMPLETE CBC W/AUTO DIFF WBC: CPT | Performed by: EMERGENCY MEDICINE

## 2025-07-29 PROCEDURE — 36415 COLL VENOUS BLD VENIPUNCTURE: CPT | Performed by: EMERGENCY MEDICINE

## 2025-07-29 PROCEDURE — 73502 X-RAY EXAM HIP UNI 2-3 VIEWS: CPT | Mod: RIGHT SIDE | Performed by: RADIOLOGY

## 2025-07-29 PROCEDURE — 74177 CT ABD & PELVIS W/CONTRAST: CPT | Mod: FOREIGN READ | Performed by: RADIOLOGY

## 2025-07-29 PROCEDURE — 80053 COMPREHEN METABOLIC PANEL: CPT | Performed by: EMERGENCY MEDICINE

## 2025-07-29 PROCEDURE — 74177 CT ABD & PELVIS W/CONTRAST: CPT

## 2025-07-29 PROCEDURE — 93971 EXTREMITY STUDY: CPT

## 2025-07-29 PROCEDURE — 73502 X-RAY EXAM HIP UNI 2-3 VIEWS: CPT | Mod: RT

## 2025-07-29 PROCEDURE — 99285 EMERGENCY DEPT VISIT HI MDM: CPT | Mod: 25 | Performed by: EMERGENCY MEDICINE

## 2025-07-29 RX ORDER — KETOROLAC TROMETHAMINE 15 MG/ML
15 INJECTION, SOLUTION INTRAMUSCULAR; INTRAVENOUS ONCE
Status: DISCONTINUED | OUTPATIENT
Start: 2025-07-29 | End: 2025-07-30 | Stop reason: HOSPADM

## 2025-07-29 RX ADMIN — IOHEXOL 75 ML: 350 INJECTION, SOLUTION INTRAVENOUS at 21:02

## 2025-07-29 ASSESSMENT — PAIN DESCRIPTION - ORIENTATION: ORIENTATION: RIGHT;LOWER

## 2025-07-29 ASSESSMENT — PAIN - FUNCTIONAL ASSESSMENT: PAIN_FUNCTIONAL_ASSESSMENT: 0-10

## 2025-07-29 ASSESSMENT — PAIN DESCRIPTION - FREQUENCY: FREQUENCY: CONSTANT/CONTINUOUS

## 2025-07-29 ASSESSMENT — PAIN SCALES - GENERAL: PAINLEVEL_OUTOF10: 8

## 2025-07-29 ASSESSMENT — PAIN DESCRIPTION - DESCRIPTORS: DESCRIPTORS: BURNING

## 2025-07-29 ASSESSMENT — PAIN DESCRIPTION - LOCATION: LOCATION: BACK

## 2025-07-30 LAB
BACTERIA UR CULT: NORMAL
HOLD SPECIMEN: NORMAL

## 2025-07-30 NOTE — DISCHARGE INSTRUCTIONS
There was no cause for your symptoms found in the emergency department today.  Your workup showed no significant acute findings.  Your symptoms may be secondary to the muscles as well as arthritis in your right hip.  It is important that you take your medications as prescribed and follow-up with your primary care physician to discuss your visit.  If you have any change or worsening of symptoms return immediately for reevaluation.

## 2025-07-30 NOTE — ED PROVIDER NOTES
"EMERGENCY DEPARTMENT ENCOUNTER      Pt Name: Maye Lopez  MRN: [id number]  Birthdate 1965  Date of evaluation: 7/29/2025  ED Provider: Christine Le DO     CHIEF COMPLAINT       Chief Complaint   Patient presents with    Flank Pain     Started a few days ago. burning pain left flank. Denies painful or difficulty urination. Denies hx of kidney stones. Denies fevers        HISTORY OF PRESENT ILLNESS    Maye Lopez is a 60 y.o. who presents to the emergency department with complaint of right flank pain.  She states that initially worsened a few days ago however has been present for greater than a month.  She describes as a burning and numbness in the right flank, not left contrary to triage note.  It goes from the right mid back area down to her upper glutes and the occasion will radiate down her leg.  She states she has known history of adrenal cysts and \"spots\" on her kidneys.  She questions whether this may be causing her symptoms.  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  She notices that her right leg is more swollen than her left which is atypical.  She denies any urinary complaints.  She also notices pain in her right hip as well that radiates into the groin.  She does have an upcoming appointment with her primary care however she states she had difficulty sleeping last night secondary to the pain.    REVIEW OF SYSTEMS     Focused ROS performed and negative other than as listed in HPI    PAST MEDICAL HISTORY   Medical History[1]    SURGICAL HISTORY     Surgical History[2]    CURRENT MEDICATIONS       Discharge Medication List as of 7/29/2025 10:39 PM        CONTINUE these medications which have NOT CHANGED    Details   albuterol 90 mcg/actuation inhaler Inhale 2 puffs every 4 hours if needed for wheezing., Starting Fri 1/5/2024, Until Sun 2/4/2024 at 2359, Print      cetirizine (ZyrTEC) 10 mg tablet Take 1 tablet (10 mg) by mouth once daily for 30 doses., " Starting Fri 1/5/2024, Until Sun 2/4/2024, Print      fluticasone propionate 93 mcg/actuation aerosol breath activated 2 puffs each nostril twice daily, Normal      semaglutide, weight loss, (Wegovy) 0.25 mg/0.5 mL pen injector Inject 0.25 mg under the skin 1 (one) time per week., Starting Tue 1/23/2024, Normal             ALLERGIES     Avelox [moxifloxacin], Ciprofloxacin, Phenergan [promethazine], and Sulfa dyne    FAMILY HISTORY     Family History[3]     SOCIAL HISTORY     Social History[4]    PHYSICAL EXAM       ED Triage Vitals [07/29/25 1914]   Temperature Heart Rate Respirations BP   36.7 °C (98.1 °F) 81 17 169/88      Pulse Ox Temp Source Heart Rate Source Patient Position   96 % Temporal Monitor Sitting      BP Location FiO2 (%)     Right arm --        General: Appears well, no acute distress, alert  Head: Head atraumatic; normocephalic  Eyes: normal inspection; no icterus  ENT: mucosa moist without lesion  Neck: Normal inspection, no meningeal signs  Resp: Normal breath sounds, no wheeze or crackles; No respiratory distress  Chest Wall: no tenderness or deformity  Heart: Heart rate and rhythm regular; No Murmurs  Abdomen: Soft, Non-tender; No distention, guarding, rigidity, or rebound  MSK: Normal appearance; Moves all extremities; bilateral lower extremity edema slightly worse on the right, nonpitting; no midline spinal tenderness, tenderness to mid right lumbar area, no midline spinal tenderness, sensation intact dermatomes L2 through S1 bilaterally, strength intact to hip flexors/extensors, knee flexors/extensors, plantar/dorsiflexion bilaterally  Neuro: Alert; no focal deficits, moves all extremities  Psych: Mood and Affect normal  Skin: Color appropriate; warm; Dry    DIAGNOSTIC RESULTS   Lab and radiology results are independently interpreted unless noted below.  RADIOLOGY (Per Emergency Physician):     Interpretation per the Radiologist below, if available at the time of this note:  CT abdomen  pelvis w IV contrast   Final Result   *No acute imaging evidence in the abdomen or pelvis to explain   the etiology the patient's symptoms.   Signed by Dior Vu MD      Lower extremity venous duplex right   Final Result   Negative study.  No deep venous thrombosis of the  right lower   extremity.        MACRO:   None        Signed by: Matias Herrera 7/29/2025 9:37 PM   Dictation workstation:   [id number]      XR hip right with pelvis when performed 2 or 3 views   Final Result   1. Normal radiographic evaluation of right hip.        MACRO:        None.        Signed by: Rosa M Oliva 7/29/2025 9:34 PM   Dictation workstation:   [id number]          LABS:  Abnormal Labs Reviewed   URINALYSIS WITH REFLEX CULTURE AND MICROSCOPIC - Abnormal; Notable for the following components:       Result Value    Leukocyte Esterase, Urine 25 Anitha/uL (*)     All other components within normal limits   COMPREHENSIVE METABOLIC PANEL - Abnormal; Notable for the following components:    Glucose 104 (*)     Creatinine 1.08 (*)     eGFR 60 (*)     All other components within normal limits       All other labs were within normal range or not returned as of this dictation.    EMERGENCY DEPARTMENT COURSE/MDM   Patient presents with ongoing pain to the right lower mid back.  She complains of a burning and numbness sensation.  There is no rash or vesicular lesions indicative of shingles.  This has been going on for the past several weeks though acutely worsened recently.  Differential is broad though I do suspect more superficial pathology such as musculoskeletal etiology as well as arthropathy in the hip.  I have lower suspicion for acute pathology such as kidney stone, fracture, or acute intra-abdominal abnormality.  However given patient concern CT scan will be obtained.  Basic blood work is ordered x-ray of the hip and a venous duplex will be obtained of the extremity as well.  Patient is agreeable with this plan of care.    ED Course  as of 07/30/25 0006   e Jul 29, 2025 2239 Patient's workup returned showing a minimal elevation in creatinine compared to prior but otherwise no specific acute abnormalities.  Urinalysis demonstrated no evidence of acute urinary tract infection.  CT scan showed facet arthropathy and arthritis in the left SI joint but no abnormalities on the right area or areas of concern.  There was no DVT in the right leg and the right hip showed no acute traumatic pathology.  Patient is updated on these findings.  She is comfortable with plan of discharge.  She is advised to use lidocaine patches over-the-counter as well as Tylenol and Motrin.  She does have an upcoming appointment with her primary care.  She is reassured by the unremarkable findings.  She is to return though if anything changes or worsens in any way. [EF]      ED Course User Index  [EF] Christine Le, DO         Diagnoses as of 07/30/25 0006   Flank pain   Pain of right hip       Meds Administered:  Medications   ketorolac (Toradol) injection 15 mg (15 mg intravenous Not Given 7/29/25 2035)   iohexol (OMNIPaque) 350 mg iodine/mL solution 75 mL (75 mL intravenous Given 7/29/25 2102)       PROCEDURES   Unless otherwise noted below, none  Procedures      FINAL IMPRESSION      1. Flank pain    2. Pain of right hip          DISPOSITION    Discharge 07/29/2025 10:38:52 PM  As a result of the work-up, the patient was discharged home.  she was informed of her diagnosis and instructed to come back with any concerns or worsening of condition.  she and was agreeable to the plan as discussed above.  she was given the opportunity to ask questions.  All of the patient's questions were answered.    Critical Care time: Not Indicated    (Comment: Please note this report has been produced using speech recognition software and may contain errors related to that system including errors in grammar, punctuation, and spelling, as well as words and phrases that may be inappropriate.   If there are any questions or concerns please feel free to contact the dictating provider for clarification.)    Christine Le DO, MPH (electronically signed)  Emergency Medicine Physician    History provided by: Patient  Limitations to History: None  External Records Reviewed with Brief Summary: Progress note from 9/24 from rheumatology showing well-controlled necrotizing myopathy on CellCept and mycophenolate noted that the disease was well-controlled per patient report  Social Determinants of Health which Significantly Impact Care: None identified   EKG Independent Interpretation: EKG not obtained  Independent Result Review and Interpretation: Relevant laboratory and radiographic results were reviewed and independently interpreted by myself.  As necessary, they are commented on in the ED Course.  Chronic conditions affecting the patient's care: As documented above in MDM  The patient was discussed with the following consultants/services: None  Care Considerations: As documented above in MDM                 [1]   Past Medical History:  Diagnosis Date    Personal history of diseases of the blood and blood-forming organs and certain disorders involving the immune mechanism     History of autoimmune disorder    Personal history of other diseases of the nervous system and sense organs     History of obstructive sleep apnea   [2]   Past Surgical History:  Procedure Laterality Date    OTHER SURGICAL HISTORY  11/02/2022    Sinus surgery    OTHER SURGICAL HISTORY  11/02/2022    Hysterectomy   [3] No family history on file.  [4]   Social History  Socioeconomic History    Marital status: Significant Other   Tobacco Use    Smoking status: Unknown     Social Drivers of Health     Financial Resource Strain: Low Risk  (9/9/2024)    Received from [facility]    Overall Financial Resource Strain (CARDIA)     Difficulty of Paying Living Expenses: Not hard at all   Food Insecurity: Patient Declined (9/9/2024)    Received  from [facility]    Hunger Vital Sign     Within the past 12 months, you worried that your food would run out before you got the money to buy more.: Patient declined     Within the past 12 months, the food you bought just didn't last and you didn't have money to get more.: Patient declined   Transportation Needs: Patient Declined (9/9/2024)    Received from [facility]    PRAPARE - Transportation     Lack of Transportation (Medical): Patient declined     Lack of Transportation (Non-Medical): Patient declined   Physical Activity: Sufficiently Active (9/9/2024)    Received from [facility]    Exercise Vital Sign     On average, how many days per week do you engage in moderate to strenuous exercise (like a brisk walk)?: 5 days     On average, how many minutes do you engage in exercise at this level?: 30 min   Stress: Stress Concern Present (9/9/2024)    Received from [facility] - Occupational Stress Questionnaire     Feeling of Stress : Very much   Social Connections: Socially Integrated (9/9/2024)    Received from [facility]    Social Connection and Isolation Panel     In a typical week, how many times do you talk on the phone with family, friends, or neighbors?: More than three times a week     How often do you get together with friends or relatives?: More than three times a week     How often do you attend Tenriism or Muslim services?: More than 4 times per year     Do you belong to any clubs or organizations such as Tenriism groups, unions, fraternal or athletic groups, or school groups?: Yes     How often do you attend meetings of the clubs or organizations you belong to?: 1 to 4 times per year     Are you , , , , never , or living with a partner?: Living with partner   Housing Stability: Low Risk  (4/14/2023)    Received from [facility]    Housing Stability Vital Sign     Unable to Pay for Housing in  the Last Year: No     Number of Places Lived in the Last Year: 1     Unstable Housing in the Last Year: No        Christine Le DO  07/30/25 0008